# Patient Record
Sex: MALE | Race: WHITE | NOT HISPANIC OR LATINO | Employment: UNEMPLOYED | URBAN - METROPOLITAN AREA
[De-identification: names, ages, dates, MRNs, and addresses within clinical notes are randomized per-mention and may not be internally consistent; named-entity substitution may affect disease eponyms.]

---

## 2017-07-28 ENCOUNTER — COMMUNICATION - HEALTHEAST (OUTPATIENT)
Dept: SCHEDULING | Facility: CLINIC | Age: 27
End: 2017-07-28

## 2017-07-28 ENCOUNTER — OFFICE VISIT - HEALTHEAST (OUTPATIENT)
Dept: FAMILY MEDICINE | Facility: CLINIC | Age: 27
End: 2017-07-28

## 2017-07-28 DIAGNOSIS — L03.115 CELLULITIS OF RIGHT THIGH: ICD-10-CM

## 2017-07-28 DIAGNOSIS — S71.111D LACERATION OF RIGHT THIGH, SUBSEQUENT ENCOUNTER: ICD-10-CM

## 2017-07-30 ENCOUNTER — OFFICE VISIT - HEALTHEAST (OUTPATIENT)
Dept: FAMILY MEDICINE | Facility: CLINIC | Age: 27
End: 2017-07-30

## 2017-07-30 DIAGNOSIS — L03.115 CELLULITIS OF RIGHT THIGH: ICD-10-CM

## 2017-07-30 DIAGNOSIS — S71.111D LACERATION OF RIGHT THIGH, SUBSEQUENT ENCOUNTER: ICD-10-CM

## 2017-08-09 ENCOUNTER — OFFICE VISIT - HEALTHEAST (OUTPATIENT)
Dept: FAMILY MEDICINE | Facility: CLINIC | Age: 27
End: 2017-08-09

## 2017-08-09 DIAGNOSIS — Z48.02 VISIT FOR SUTURE REMOVAL: ICD-10-CM

## 2018-12-05 ENCOUNTER — OFFICE VISIT - HEALTHEAST (OUTPATIENT)
Dept: FAMILY MEDICINE | Facility: CLINIC | Age: 28
End: 2018-12-05

## 2018-12-05 DIAGNOSIS — J32.0 CHRONIC MAXILLARY SINUSITIS: ICD-10-CM

## 2019-08-23 ENCOUNTER — TELEPHONE (OUTPATIENT)
Dept: BEHAVIORAL HEALTH | Facility: CLINIC | Age: 29
End: 2019-08-23

## 2019-08-23 NOTE — TELEPHONE ENCOUNTER
IV User Heroin  Referral from [facility] Meg Lepe [phone]  Reg updated, no prior call.     Referral created. Inbasket to verifiers.   Call to Meg and lvm asking she and or pt call to confirm insur bens. Referral on hold until insurance verified. Filed in copy room  tha

## 2019-08-26 NOTE — TELEPHONE ENCOUNTER
Patient called with his [facility] insurance info. Already sent to verifiers this am.

## 2019-08-27 NOTE — TELEPHONE ENCOUNTER
"  LP SCREEN TELEPHONE NOTE   [facility] [place] paperwork was reviewed by JED Dee and the patient was deemed ELIGIBLE for the LP program.     Medical: The patient is medically stable and did not appear to need a medical screening with the LP RN at this time. No meds. No open wounds.      Insurance: [facility] LOCAL - \"ind/fam gold plan\". Patient talked with Maylin in the Business Office \"Rec'd call from Pt. Per notes, Pt would owe $7k OOP max for L+ program with a 1/3 deposit up front including the R&B fee. Pt said he would think through his options.\"                      The admitting counselor NEEDS to notify CENTRAL INTAKE of the patient's admission to treatment on the day/evening of the admission with a routed telephone note.   After being informed of the admission date for treatment CENTRAL INTAKE will need to fill out the [facility] referral forms with the patient's start date and then fax the [facility] referral forms to [facility] to activate the authorization for treatment.     This will be a: HALF+ evaluation. (2-PAGE R25 UPDATE NOTE, LP UPDATE NOTE, ADDENDUM, VAA, ISP, SHAHRIAR's, ABA, & SBAR)     IV use or Pregnant: This patient is not pregnant or an IV drug user.  No sex offenses.      Business office: Last name H-Q:  The patient will need to talk to and be financially approved for the LP program by Bradley Rodgers @ ([phone]) or by someone else @ the main B.O. #: [phone]     List: This patient CAN be placed on the COMMUNITY LP Waiting List now.      Group: Mixed Group     Additional Info as needed: He uses heroin daily (snort). He last used today. He is aware of Detox protocol, but it feels getting in is impossible, so he will detox himself. He does not have prescribed Suboxone. I told him the waitlist is 3 to 4 weeks, and he should beclean for a week prior to not be at risk of withdrawal. I mailed him an LP Info Packet and send Inbasket to Business Office. I also gave him [facility]'s # because they usually " have sooner openings. Due to OOP costs, he may look elsewhere, but please place him LP list.      The best current contact telephone number for the patient is: [phone].        Thanks,  JED Dee   8/27/2019

## 2019-08-29 ENCOUNTER — HOSPITAL ENCOUNTER (INPATIENT)
Facility: CLINIC | Age: 29
LOS: 1 days | Discharge: LEFT BEFORE DISCHARGE | DRG: 894 | End: 2019-08-30
Attending: EMERGENCY MEDICINE | Admitting: PSYCHIATRY & NEUROLOGY
Payer: COMMERCIAL

## 2019-08-29 DIAGNOSIS — F11.10 HEROIN ABUSE (H): ICD-10-CM

## 2019-08-29 LAB
ALBUMIN SERPL-MCNC: 4.2 G/DL (ref 3.4–5)
ALP SERPL-CCNC: 76 U/L (ref 40–150)
ALT SERPL W P-5'-P-CCNC: 38 U/L (ref 0–70)
AMPHETAMINES UR QL SCN: NEGATIVE
ANION GAP SERPL CALCULATED.3IONS-SCNC: 9 MMOL/L (ref 3–14)
AST SERPL W P-5'-P-CCNC: 18 U/L (ref 0–45)
BARBITURATES UR QL: NEGATIVE
BASOPHILS # BLD AUTO: 0 10E9/L (ref 0–0.2)
BASOPHILS NFR BLD AUTO: 0.2 %
BENZODIAZ UR QL: NEGATIVE
BILIRUB SERPL-MCNC: 0.7 MG/DL (ref 0.2–1.3)
BUN SERPL-MCNC: 10 MG/DL (ref 7–30)
CALCIUM SERPL-MCNC: 9.2 MG/DL (ref 8.5–10.1)
CANNABINOIDS UR QL SCN: NEGATIVE
CHLORIDE SERPL-SCNC: 103 MMOL/L (ref 94–109)
CO2 SERPL-SCNC: 25 MMOL/L (ref 20–32)
COCAINE UR QL: NEGATIVE
CREAT SERPL-MCNC: 0.86 MG/DL (ref 0.66–1.25)
DIFFERENTIAL METHOD BLD: ABNORMAL
EOSINOPHIL # BLD AUTO: 0 10E9/L (ref 0–0.7)
EOSINOPHIL NFR BLD AUTO: 0.2 %
ERYTHROCYTE [DISTWIDTH] IN BLOOD BY AUTOMATED COUNT: 12.4 % (ref 10–15)
ETHANOL UR QL SCN: NEGATIVE
GFR SERPL CREATININE-BSD FRML MDRD: >90 ML/MIN/{1.73_M2}
GLUCOSE SERPL-MCNC: 106 MG/DL (ref 70–99)
HCT VFR BLD AUTO: 46.8 % (ref 40–53)
HGB BLD-MCNC: 16.9 G/DL (ref 13.3–17.7)
IMM GRANULOCYTES # BLD: 0 10E9/L (ref 0–0.4)
IMM GRANULOCYTES NFR BLD: 0.3 %
LYMPHOCYTES # BLD AUTO: 1.3 10E9/L (ref 0.8–5.3)
LYMPHOCYTES NFR BLD AUTO: 10.3 %
MCH RBC QN AUTO: 29.6 PG (ref 26.5–33)
MCHC RBC AUTO-ENTMCNC: 36.1 G/DL (ref 31.5–36.5)
MCV RBC AUTO: 82 FL (ref 78–100)
MONOCYTES # BLD AUTO: 0.6 10E9/L (ref 0–1.3)
MONOCYTES NFR BLD AUTO: 5 %
NEUTROPHILS # BLD AUTO: 10.3 10E9/L (ref 1.6–8.3)
NEUTROPHILS NFR BLD AUTO: 84 %
NRBC # BLD AUTO: 0 10*3/UL
NRBC BLD AUTO-RTO: 0 /100
OPIATES UR QL SCN: POSITIVE
PLATELET # BLD AUTO: 237 10E9/L (ref 150–450)
POTASSIUM SERPL-SCNC: 3.6 MMOL/L (ref 3.4–5.3)
PROT SERPL-MCNC: 8.3 G/DL (ref 6.8–8.8)
RBC # BLD AUTO: 5.71 10E12/L (ref 4.4–5.9)
SODIUM SERPL-SCNC: 137 MMOL/L (ref 133–144)
WBC # BLD AUTO: 12.2 10E9/L (ref 4–11)

## 2019-08-29 PROCEDURE — 80320 DRUG SCREEN QUANTALCOHOLS: CPT | Performed by: FAMILY MEDICINE

## 2019-08-29 PROCEDURE — 80053 COMPREHEN METABOLIC PANEL: CPT | Performed by: FAMILY MEDICINE

## 2019-08-29 PROCEDURE — 99285 EMERGENCY DEPT VISIT HI MDM: CPT | Mod: 25

## 2019-08-29 PROCEDURE — 85025 COMPLETE CBC W/AUTO DIFF WBC: CPT | Performed by: FAMILY MEDICINE

## 2019-08-29 PROCEDURE — 80307 DRUG TEST PRSMV CHEM ANLYZR: CPT | Performed by: FAMILY MEDICINE

## 2019-08-29 PROCEDURE — 99285 EMERGENCY DEPT VISIT HI MDM: CPT | Mod: Z6 | Performed by: EMERGENCY MEDICINE

## 2019-08-29 SDOH — HEALTH STABILITY: MENTAL HEALTH: HOW OFTEN DO YOU HAVE A DRINK CONTAINING ALCOHOL?: NEVER

## 2019-08-29 ASSESSMENT — ACTIVITIES OF DAILY LIVING (ADL)
BATHING: 0-->INDEPENDENT
DRESS: 0-->INDEPENDENT
PRIOR_FUNCTIONAL_LEVEL_COMMENT: GOOD
SWALLOWING: 0-->SWALLOWS FOODS/LIQUIDS WITHOUT DIFFICULTY
RETIRED_EATING: 0-->INDEPENDENT
TOILETING: 0-->INDEPENDENT
RETIRED_COMMUNICATION: 0-->UNDERSTANDS/COMMUNICATES WITHOUT DIFFICULTY

## 2019-08-29 ASSESSMENT — MIFFLIN-ST. JEOR: SCORE: 1924.27

## 2019-08-30 VITALS
RESPIRATION RATE: 16 BRPM | HEIGHT: 72 IN | HEART RATE: 75 BPM | SYSTOLIC BLOOD PRESSURE: 136 MMHG | WEIGHT: 200 LBS | TEMPERATURE: 98.9 F | BODY MASS INDEX: 27.09 KG/M2 | DIASTOLIC BLOOD PRESSURE: 79 MMHG | OXYGEN SATURATION: 96 %

## 2019-08-30 PROBLEM — F11.93 OPIATE WITHDRAWAL (H): Status: ACTIVE | Noted: 2019-08-30

## 2019-08-30 PROCEDURE — 12800008 ZZH R&B CD ADULT

## 2019-08-30 PROCEDURE — 99234 HOSP IP/OBS SM DT SF/LOW 45: CPT | Performed by: PSYCHIATRY & NEUROLOGY

## 2019-08-30 PROCEDURE — 25000132 ZZH RX MED GY IP 250 OP 250 PS 637: Performed by: PSYCHIATRY & NEUROLOGY

## 2019-08-30 PROCEDURE — 25000132 ZZH RX MED GY IP 250 OP 250 PS 637: Performed by: CLINICAL NURSE SPECIALIST

## 2019-08-30 PROCEDURE — 25000131 ZZH RX MED GY IP 250 OP 636 PS 637: Performed by: CLINICAL NURSE SPECIALIST

## 2019-08-30 PROCEDURE — 99231 SBSQ HOSP IP/OBS SF/LOW 25: CPT | Performed by: PHYSICIAN ASSISTANT

## 2019-08-30 PROCEDURE — 99207 ZZC CDG-HISTORY COMP: MEETS EXP. PROBLEM FOCUSED - DOWN CODED LACK OF HPI: CPT | Performed by: PHYSICIAN ASSISTANT

## 2019-08-30 PROCEDURE — H2032 ACTIVITY THERAPY, PER 15 MIN: HCPCS

## 2019-08-30 PROCEDURE — HZ2ZZZZ DETOXIFICATION SERVICES FOR SUBSTANCE ABUSE TREATMENT: ICD-10-PCS | Performed by: PSYCHIATRY & NEUROLOGY

## 2019-08-30 RX ORDER — ACETAMINOPHEN 325 MG/1
650 TABLET ORAL EVERY 4 HOURS PRN
Status: DISCONTINUED | OUTPATIENT
Start: 2019-08-30 | End: 2019-08-30 | Stop reason: HOSPADM

## 2019-08-30 RX ORDER — NICOTINE 21 MG/24HR
1 PATCH, TRANSDERMAL 24 HOURS TRANSDERMAL DAILY
Status: DISCONTINUED | OUTPATIENT
Start: 2019-08-30 | End: 2019-08-30 | Stop reason: HOSPADM

## 2019-08-30 RX ORDER — HYDROXYZINE HYDROCHLORIDE 25 MG/1
25-50 TABLET, FILM COATED ORAL EVERY 4 HOURS PRN
Status: DISCONTINUED | OUTPATIENT
Start: 2019-08-30 | End: 2019-08-30 | Stop reason: HOSPADM

## 2019-08-30 RX ORDER — ALUMINA, MAGNESIA, AND SIMETHICONE 2400; 2400; 240 MG/30ML; MG/30ML; MG/30ML
30 SUSPENSION ORAL EVERY 4 HOURS PRN
Status: DISCONTINUED | OUTPATIENT
Start: 2019-08-30 | End: 2019-08-30 | Stop reason: HOSPADM

## 2019-08-30 RX ORDER — LOPERAMIDE HCL 2 MG
2 CAPSULE ORAL 4 TIMES DAILY PRN
Status: DISCONTINUED | OUTPATIENT
Start: 2019-08-30 | End: 2019-08-30 | Stop reason: HOSPADM

## 2019-08-30 RX ORDER — ONDANSETRON 4 MG/1
4 TABLET, ORALLY DISINTEGRATING ORAL EVERY 6 HOURS PRN
Status: DISCONTINUED | OUTPATIENT
Start: 2019-08-30 | End: 2019-08-30 | Stop reason: HOSPADM

## 2019-08-30 RX ORDER — BUPRENORPHINE 2 MG/1
2 TABLET SUBLINGUAL
Status: COMPLETED | OUTPATIENT
Start: 2019-08-30 | End: 2019-08-30

## 2019-08-30 RX ORDER — BUPRENORPHINE 2 MG/1
2 TABLET SUBLINGUAL 4 TIMES DAILY
Status: DISCONTINUED | OUTPATIENT
Start: 2019-08-30 | End: 2019-08-30 | Stop reason: HOSPADM

## 2019-08-30 RX ADMIN — BUPRENORPHINE HCL 2 MG: 2 TABLET SUBLINGUAL at 16:20

## 2019-08-30 RX ADMIN — BUPRENORPHINE HCL 2 MG: 2 TABLET SUBLINGUAL at 10:05

## 2019-08-30 RX ADMIN — NICOTINE 1 PATCH: 21 PATCH, EXTENDED RELEASE TRANSDERMAL at 10:05

## 2019-08-30 RX ADMIN — HYDROXYZINE HYDROCHLORIDE 50 MG: 25 TABLET, FILM COATED ORAL at 01:53

## 2019-08-30 RX ADMIN — BUPRENORPHINE HCL 2 MG: 2 TABLET SUBLINGUAL at 13:23

## 2019-08-30 RX ADMIN — ONDANSETRON 4 MG: 4 TABLET, ORALLY DISINTEGRATING ORAL at 10:05

## 2019-08-30 ASSESSMENT — ACTIVITIES OF DAILY LIVING (ADL)
HYGIENE/GROOMING: INDEPENDENT
DRESS: INDEPENDENT
ORAL_HYGIENE: INDEPENDENT

## 2019-08-30 ASSESSMENT — ENCOUNTER SYMPTOMS
COLOR CHANGE: 0
RHINORRHEA: 1
EYE REDNESS: 0
NAUSEA: 1
ABDOMINAL PAIN: 0
MYALGIAS: 1
HEADACHES: 0
CONFUSION: 0
VOMITING: 1
FEVER: 0
DIFFICULTY URINATING: 0
ARTHRALGIAS: 0
SHORTNESS OF BREATH: 0
NECK STIFFNESS: 0

## 2019-08-30 ASSESSMENT — MIFFLIN-ST. JEOR: SCORE: 1915.19

## 2019-08-30 NOTE — PROGRESS NOTES
Case Management Note  8/30/2019    Met with pt to discuss discharge planning. Pt reports he wants to attend LP, hopes he can get back on the waiting list. Pt had been offered an LP bed 8/29/19, but had declined it due to high OOP costs and been taken off the community waitlist. Pt's OOP costs approx $7,000 for LP. Writer agreed to have pt placed back on priority waitlist, but also offered to review other programs to see if there are any that are better covered with his [employer] commercial plan. Pt open to other options. CM continues.     Leann Haskins, LAURA, LADC

## 2019-08-30 NOTE — TELEPHONE ENCOUNTER
"8/30/2019  Pt was approved by Anjana Bennett on 8/27/2019 for LP.  Pt is currently on 3A.      LP SCREEN TELEPHONE NOTE   [facility] [place] paperwork was reviewed by JED Baron and the patient was deemed ELIGIBLE for the LP program.     Medical: Deferred, because this patient is currently on 3A IP detoxification unit.     Insurance: [facility] LOCAL - The admitting counselor NEEDS to notify CENTRAL INTAKE of the patient's admission to treatment on the day/evening of the admission with a routed telephone note.   After being informed of the admission date for treatment CENTRAL INTAKE will need to fill out the [facility] referral forms with the patient's start date and then fax the [facility] referral forms to [facility] to activate the authorization for treatment.     This will be a: 3A DIRECT TRANSFER (F140 will complete the LP UPDATE NOTE, VAA, ISP, SHAHRIAR's, DAANES & Update SBAR)     IV use or Pregnant: This patient is not pregnant or an IV drug user.     Business office: pt needs to be cleared by the CHENG.    Patient talked with Maylin in the Business Office \"Rec'd call from Pt. Per notes, Pt would owe $7k OOP max for L+ program with a 1/3 deposit up front including the R&B fee. Pt said he would think through his options.\"       List: This patient CAN NOT be placed on the PRIORITY LP Waiting List until after being financially approved for the LP program by a [facility] .       Group: Men's Group or [facility] Enhanced Mixed Group     Additional Info as needed: NA     The best current contact telephone number for the patient is: 3A @ [phone]     Thanks,  JED Baron   8/30/2019    "

## 2019-08-30 NOTE — PROGRESS NOTES
08/30/19 0110   Patient Belongings   Did you bring any home meds/supplements to the hospital?  No   Patient Belongings locker   Patient Belongings Put in Hospital Secure Location (Security or Locker, etc.) clothing;shoes   Belongings Search Yes   Clothing Search Yes   Second Staff  Gus Brady All of patient belongings are in a bin.     BIN:  Shirt, shorts, jogging pants, a hoodie, shoes and socks.    A               Admission:  I am responsible for any personal items that are not sent to the safe or pharmacy.  [facility] is not responsible for loss, theft or damage of any property in my possession.    Signature:  _________________________________ Date: _______  Time: _____                                              Staff Signature:  ____________________________ Date: ________  Time: _____      2nd Staff person, if patient is unable/unwilling to sign:    Signature: ________________________________ Date: ________  Time: _____     Discharge:  [facility] has returned all of my personal belongings:    Signature: _________________________________ Date: ________  Time: _____                                          Staff Signature:  ____________________________ Date: ________  Time: _____

## 2019-08-30 NOTE — PROGRESS NOTES
Case Management Note  8/30/2019    Received email from [facility] regarding covered programs. Pt is covered for [facility] programs. Likely not covered for [facility]. Pt signed releases for both and requested info be sent to [facility]. Writer faxed pt's information this afternoon. Pt feels capable of following up outside of the hospital. CM complete at this time.     Leann Haskins LPC, [facility]

## 2019-08-30 NOTE — CONSULTS
[facility], [place]  Consult Note - Hospitalist Service     Date of Admission:  8/29/2019  Consult Requested by: Debra Naegele, CNP  Reason for Consult: Medical co-management    Assessment & Plan   James Brown is a 29 year old male with a history of opiate abuse who was admitted to station 3A on 8/29/19 seeking detoxification from opiates.    Opiate abuse in acute withdrawal: Using 1 gram of heroine daily, last used yesterday. Utox in the ED + for opiates.    - Management per Psychiatry    Leukocytosis: WBC 12.2 on admission, most likely stress response secondary to acute opiate withdrawal. He is afebrile and lacks any s/s suggestive of an infectious process.    - Repeat CBC on Sunday 9/1    Tobacco abuse: Smoking at least 1/2 ppd, would like to try quitting.    - Continue nicotine patch     Medicine will follow results of repeat CBC. Please do not hesitate to contact if new questions or concerns arise.       Amanda Collins PA-C  [facility], [place]  Hospitalist Service  Pager: [phone]      ______________________________________________________________________    Chief Complaint   Opiate withdrawal    History is obtained from the patient    History of Present Illness   James Brown is a 29 year old male with a history of opiate abuse who was admitted to station 3A on 8/29/19 seeking detoxification from opiates. He has been using 1 gram of heroine daily, smoking and snorting. Last used yesterday. No IVDU. No other substance use. No history of seizures. Currently he reports feeling nauseated, tired, having stomach cramps. Ate some breakfast but does not have much of an appetite. No vomiting, diarrhea, constipation. Currently smoking at least 1/2 ppd and is interested in quitting. Has started on nicotine replacement here and feels that will be helpful.     He is otherwise healthy with no ongoing medical problems. No history of cardiovascular, pulmonary,  or renal disease. Is not diabetic. Denies any chest pain/pressure, palpitations, dyspnea, cough, cold symptoms, fever/chills, rashes, urinary symptoms.     Review of Systems   The 10 point Review of Systems is negative other than noted in the HPI or here.     Past Medical History    I have reviewed this patient's medical history and updated it with pertinent information if needed.   Past Medical History:   Diagnosis Date     Substance abuse (H)        Past Surgical History   I have reviewed this patient's surgical history and updated it with pertinent information if needed.  History reviewed. No pertinent surgical history.    Social History   I have reviewed this patient's social history and updated it with pertinent information if needed.  Social History     Tobacco Use     Smoking status: Current Every Day Smoker     Smokeless tobacco: Never Used   Substance Use Topics     Alcohol use: Never     Frequency: Never     Drug use: Yes     Types: Opiates, Other       Family History   I have reviewed this patient's family history and updated it with pertinent information if needed.   No family history on file.    Medications   Current Facility-Administered Medications   Medication     acetaminophen (TYLENOL) tablet 650 mg     alum & mag hydroxide-simethicone (MYLANTA ES/MAALOX  ES) suspension 30 mL     buprenorphine (SUBUTEX) sublingual tablet 2 mg     hydrOXYzine (ATARAX) tablet 25-50 mg     loperamide (IMODIUM) capsule 2 mg     magnesium hydroxide (MILK OF MAGNESIA) suspension 30 mL     nicotine (NICODERM CQ) 21 MG/24HR 24 hr patch 1 patch     nicotine Patch in Place     [START ON 8/31/2019] nicotine patch REMOVAL     ondansetron (ZOFRAN-ODT) ODT tab 4 mg       Allergies   No Known Allergies    Physical Exam   Vital Signs: Temp: 97.8  F (36.6  C) Temp src: Oral BP: (!) 139/92 Pulse: 118   Resp: 16 SpO2: 99 % O2 Device: None (Room air)    Weight: 200 lbs 0 oz    Constitutional: Awake and alert, in no apparent distress.    Eyes: Sclera clear, anicteric   ENT: Mucous membranes moist. PERRL  Hematologic / Lymphatic: No cervical or supraclavicular lymphadenopathy.   Respiratory: Breathing comfortably on room air. CTA bilaterally with no crackles, wheezing, or rhonchi. Good air entry throughout.   Cardiovascular: Tachycardic, regular rhythm. Normal S1/S2. No rubs or murmurs. Intact bilateral pedal pulses. No peripheral edema.   GI: Soft, non-tender, non-distended. Bowel sounds present.   Skin:  Good color. No jaundice. No visible rashes, lesions, or bruising of concern.   Neurologic: Alert and oriented to person, place, and time. No focal deficits. Moves all extremities. No tremor.       Data   ROUTINE IP LABS (Last four results)  Recent Labs   Lab 08/29/19  2218      POTASSIUM 3.6   CHLORIDE 103   CO2 25   ANIONGAP 9   *   BUN 10   CR 0.86   RALPH 9.2   PROTTOTAL 8.3   ALBUMIN 4.2   BILITOTAL 0.7   ALKPHOS 76   AST 18   ALT 38     Recent Labs   Lab 08/29/19  2218   WBC 12.2*   RBC 5.71   HGB 16.9   HCT 46.8   MCV 82   MCH 29.6   MCHC 36.1   RDW 12.4        No lab results found in last 7 days.     Glucose Values Latest Ref Rng & Units 8/29/2019   Bedside Glucose (mg/dl )  - --   GLUCOSE 70 - 99 mg/dL 106(H)

## 2019-08-30 NOTE — TELEPHONE ENCOUNTER
Pt added to lodging plus wait list/priority from [facility].      Note,  Pt does have p/a that are required before admission. See referral notes from business office.

## 2019-08-30 NOTE — PLAN OF CARE
"Pt. Requested discharge as \"I have things to do.\" Pt. Vital signs stable. COWS 4. Provider notified; discharge order placed AMA. Patient agrees. Pt denies any suicidal ideation, plans or intent at this time. Patient is discharged at this time.  "

## 2019-08-30 NOTE — PROGRESS NOTES
"CLINICAL NUTRITION SERVICES - ASSESSMENT NOTE     Nutrition Prescription    RECOMMENDATIONS FOR MDs/PROVIDERS TO ORDER:  None at this time    Malnutrition Status:    Patient does not meet two of the above criteria necessary for diagnosing malnutrition but is at risk for malnutrition    Recommendations already ordered by Registered Dietitian (RD):  None at this time- pt declined    Future/Additional Recommendations:  Monitor PO and wt trends   Monitor need for nutrition supplements/snack      REASON FOR ASSESSMENT  James Brown is a/an 29 year old male assessed by the dietitian for Admission Nutrition Risk Screen for reduced oral intake over the last month    Information obtained from chart review: Patient admitted for heroin withdrawal, suicidal ideation     NUTRITION HISTORY  Pt reports that his appetite has been poor the past few weeks. He would go 2 or 3 days without food and then eat something. James reports that when he has a good appetite he will eat 3 meals a day. He cooks at home and likes to make meat and vegetables.     CURRENT NUTRITION ORDERS  Diet: Regular  Intake/Tolerance: Pt reports that his appetite is improving a little. For lunch today he had 1/2 a , soup, and a brownie.     LABS  Labs reviewed    MEDICATIONS  Medications reviewed    ANTHROPOMETRICS  Height: 182.9 cm (6' 0\")  Most Recent Weight: 90.7 kg (200 lb)    IBW: 81 kg (112%)   BMI: Overweight BMI 25-29.9  Weight History: Wt loss of 10 lbs (4.8%) over the past 3 months.   5/19/19- 95.3 kg (210 lb)     Dosing Weight: 91 kg (most recent wt)     ASSESSED NUTRITION NEEDS  Estimated Energy Needs: 2353-0378 kcals/day (25 - 30 kcals/kg)  Justification: Maintenance  Estimated Protein Needs: 73-91 grams protein/day (0.8 - 1 grams of pro/kg)  Justification: Maintenance  Estimated Fluid Needs: (1 mL/kcal)   Justification: Maintenance    PHYSICAL FINDINGS  See malnutrition section below.    MALNUTRITION  % Intake: < 75% for > 7 days " (non-severe)  % Weight Loss: Weight loss does not meet criteria  Subcutaneous Fat Loss: None observed  Muscle Loss: None observed  Fluid Accumulation/Edema: None noted  Malnutrition Diagnosis: Patient does not meet two of the above criteria necessary for diagnosing malnutrition but is at risk for malnutrition    NUTRITION DIAGNOSIS  Inadequate oral intake related to poor appetite 2/2 substance abuse as evidenced by pt report and wt loss of 4.8% over the past 3 months.       INTERVENTIONS  Implementation  Nutrition Education: Discussed current appetite/intake and role of RD. Encouraged meals TID and snacks.    Medical food supplement therapy/snacks: Pt declined at this time.     Goals  Patient to consume % of nutritionally adequate meal trays TID, or the equivalent with supplements/snacks.     Monitoring/Evaluation  Progress toward goals will be monitored and evaluated per protocol.    Llii Jane RD, LD  Unit pager: [phone]

## 2019-08-30 NOTE — ED PROVIDER NOTES
[facility] EMERGENCY DEPARTMENT ([facility])    8/29/19     ED 17 8:45 PM   History     Chief Complaint   Patient presents with     Addiction Problem     seeking help with detox from heroin; currently having withdrawal S & S of aches, abdominal pain, N & V, runny nose; Heroin snort and smoke, used 1 gm daily, last use about 0800 this morning; does not have a detox bed on hold     Suicidal     thoughts of overdosing on pills but no intent     The history is provided by the patient and medical records.     James Brown is a 29 year old male who presents seeking opiate detox. He has been using 1 gm heroin daily via smoking and snorting, last used today. No other drug use.  When he withdraws he develops symptoms of generalized body aches, abdominal pain, nausea, vomiting and rhinorrhea.  He had some passive suicidal ideation in triage, denies any suicidal or homicidal ideation at this time. He is not on any medications, is healthy at baseline.  He did not call ahead for a bed.  He is accompanied by his father Antwon.    I have reviewed the Medications, Allergies, Past Medical and Surgical History, and Social History in the [employer] system.  PAST MEDICAL HISTORY:   Past Medical History:   Diagnosis Date     Substance abuse (H)        PAST SURGICAL HISTORY: History reviewed. No pertinent surgical history.    FAMILY HISTORY: No family history on file.    SOCIAL HISTORY:   Social History     Tobacco Use     Smoking status: Current Every Day Smoker     Smokeless tobacco: Never Used   Substance Use Topics     Alcohol use: Never     Frequency: Never       Patient's Medications    No medications on file        No Known Allergies   Review of Systems   Constitutional: Negative for fever.   HENT: Positive for rhinorrhea. Negative for congestion.    Eyes: Negative for redness.   Respiratory: Negative for shortness of breath.    Cardiovascular: Negative for chest pain.   Gastrointestinal: Positive for nausea and vomiting. Negative  for abdominal pain.   Genitourinary: Negative for difficulty urinating.   Musculoskeletal: Positive for myalgias. Negative for arthralgias and neck stiffness.   Skin: Negative for color change.   Neurological: Negative for headaches.   Psychiatric/Behavioral: Negative for confusion and suicidal ideas.       Physical Exam   BP: 133/81  Pulse: 86  Temp: 97.8  F (36.6  C)  Resp: 16  Height: 182.9 cm (6')  Weight: 91.6 kg (202 lb)  SpO2: 99 %      Physical Exam   Constitutional: He is oriented to person, place, and time. He appears well-developed and well-nourished. No distress.   HENT:   Head: Normocephalic and atraumatic.   Mouth/Throat: No oropharyngeal exudate.   Eyes: Pupils are equal, round, and reactive to light. Right eye exhibits no discharge. Left eye exhibits no discharge. No scleral icterus.   Neck: Normal range of motion. Neck supple.   Cardiovascular: Normal rate, regular rhythm, normal heart sounds and intact distal pulses. Exam reveals no gallop and no friction rub.   No murmur heard.  Pulmonary/Chest: Effort normal and breath sounds normal. No respiratory distress. He has no wheezes. He exhibits no tenderness.   Abdominal: Soft. Bowel sounds are normal. He exhibits no distension. There is no tenderness.   Musculoskeletal: Normal range of motion. He exhibits no edema, tenderness or deformity.   Neurological: He is alert and oriented to person, place, and time. No cranial nerve deficit.   Skin: Skin is warm and dry. No rash noted. He is not diaphoretic. No erythema. No pallor.   Psychiatric: He has a normal mood and affect.   Nursing note and vitals reviewed.      ED Course        Procedures             Critical Care time:  none             No results found for this or any previous visit (from the past 24 hour(s)).  Medications - No data to display      Assessments & Plan (with Medical Decision Making)   Is a 29-year-old male who presents for detox from heroin use.  Patient uses approximately 1 g/day,  snorting and smoking.  No other drug use.  Last use was this morning.  No suicidal or homicidal ideation.  Exam demonstrated no acute abnormalities.  UDS was positive for opiates.  CBC and CMP are pending at this time.  There is a bed available for patient on detox. We will admit for further monitoring work-up and treatment.    I have reviewed the nursing notes.    I have reviewed the findings, diagnosis, plan and need for follow up with the patient.    New Prescriptions    No medications on file       Final diagnoses:   None   I, Marizol Rosas, am serving as a trained medical scribe to document services personally performed by Sourav Olivas DO based on the provider's statements to me on 8/29/19.  This document has been checked and approved by the attending provider.    Sourav FAROOQ DO, was physically present and have reviewed and verified the accuracy of this note documented by Marizol Rosas medical scribe.        8/29/2019   [facility], [place], EMERGENCY DEPARTMENT     Souarv Olivas DO  08/30/19 190

## 2019-08-30 NOTE — PROGRESS NOTES
Pt came to ER seeking detox from heroin. JESSICA 8/29/19 0800. Pt smokes 1 gram per day for past several months. Pt was suppose to enter LP today but did not make it. Wants to be considered for the program. Denies other chemical use. Denies any hx CD treatment, at a detox 13 yrs ago. Cooperative and pleasant. Denies SI. Has chronic back pain from slipped discs.

## 2019-08-30 NOTE — PLAN OF CARE
Behavioral Team Discussion: (8/30/2019)    Continued Stay Criteria/Rationale: Patient admitted for heroin withdrawal, suicidal ideation .  Plan: The following services will be provided to the patient; psychiatric assessment, medication management, therapeutic milieu, individual and group support, and skills groups.   Participants: 3A Provider: Dr. Javy Santana MD; 3A RN's: Brian Sánchez RN; 3A CM's: Leann Haskins .  Summary/Recommendation: Providers will assess today for treatment recommendations, discharge planning, and aftercare plans. CM will meet with pt for discharge planning.   Medical/Physical: internal medicine consult to be completed 8/30/2019.   Precautions:   Behavioral Orders   Procedures    Code 1 - Restrict to Unit    Routine Programming     As clinically indicated    Status 15     Every 15 minutes.    Withdrawal precautions     Rationale for change in precautions or plan: N/A  Progress: Improving.

## 2019-08-30 NOTE — ED NOTES
ED to Behavioral Floor Handoff    SITUATION  James Brown is a 29 year old male who speaks English and lives in a home with family members The patient arrived in the ED by private car from home with a complaint of Addiction Problem (seeking help with detox from heroin; currently having withdrawal S & S of aches, abdominal pain, N & V, runny nose; Heroin snort and smoke, used 1 gm daily, last use about 0800 this morning; does not have a detox bed on hold) and Suicidal (thoughts of overdosing on pills but no intent)  .The patient's current symptoms started/worsened 1 day(s) ago and during this time the symptoms have increased.   In the ED, pt was diagnosed with   Final diagnoses:   None        Initial vitals were: BP: 133/81  Pulse: 86  Temp: 97.8  F (36.6  C)  Resp: 16  Height: 182.9 cm (6')  Weight: 91.6 kg (202 lb)  SpO2: 99 %   --------  Is the patient diabetic? No   If yes, last blood glucose? --     If yes, was this treated in the ED? --  --------  Is the patient inebriated (ETOH) No or Impaired on other substances? No  MSSA done? N/A  Last MSSA score: --    Were withdrawal symptoms treated? N/A  Does the patient have a seizure history? No. If yes, date of most recent seizure--  --------  Is the patient patient experiencing suicidal ideation? denies current or recent suicidal ideation     Homicidal ideation? denies current or recent homicidal ideation or behaviors.    Self-injurious behavior/urges? denies current or recent self injurious behavior or ideation.  ------  Was pt aggressive in the ED No  Was a code called No  Is the pt now cooperative? Yes  -------  Meds given in ED: Medications - No data to display   Family present during ED course? Yes  Family currently present? Yes    BACKGROUND  Does the patient have a cognitive impairment or developmental disability? No  Allergies: No Known Allergies.   Social demographics are   Social History     Socioeconomic History     Marital status: Single     Spouse name:  None     Number of children: None     Years of education: None     Highest education level: None   Occupational History     None   Social Needs     Financial resource strain: None     Food insecurity:     Worry: None     Inability: None     Transportation needs:     Medical: None     Non-medical: None   Tobacco Use     Smoking status: Current Every Day Smoker     Smokeless tobacco: Never Used   Substance and Sexual Activity     Alcohol use: Never     Frequency: Never     Drug use: Yes     Types: Opiates, Other     Sexual activity: None   Lifestyle     Physical activity:     Days per week: None     Minutes per session: None     Stress: None   Relationships     Social connections:     Talks on phone: None     Gets together: None     Attends Muslim service: None     Active member of club or organization: None     Attends meetings of clubs or organizations: None     Relationship status: None     Intimate partner violence:     Fear of current or ex partner: None     Emotionally abused: None     Physically abused: None     Forced sexual activity: None   Other Topics Concern     None   Social History Narrative     None        ASSESSMENT  Labs results   Labs Ordered and Resulted from Time of ED Arrival Up to the Time of Departure from the ED   DRUG ABUSE SCREEN 6 CHEM DEP URINE ([facility]) - Abnormal; Notable for the following components:       Result Value    Opiates Qualitative Urine Positive (*)     All other components within normal limits   COMPREHENSIVE METABOLIC PANEL - Abnormal; Notable for the following components:    Glucose 106 (*)     All other components within normal limits   CBC WITH PLATELETS DIFFERENTIAL - Abnormal; Notable for the following components:    WBC 12.2 (*)     Absolute Neutrophil 10.3 (*)     All other components within normal limits      Imaging Studies: No results found for this or any previous visit (from the past 24 hour(s)).   Most recent vital signs /81   Pulse 86   Temp 97.8  F  (36.6  C) (Oral)   Resp 16   Ht 1.829 m (6')   Wt 91.6 kg (202 lb)   SpO2 99%   BMI 27.40 kg/m     Abnormal labs/tests/findings requiring intervention:---   Pain control: pt had none  Nausea control: pt had none    RECOMMENDATION  Are any infection precautions needed (MRSA, VRE, etc.)? No If yes, what infection? --  ---  Does the patient have mobility issues? independently. If yes, what device does the pt use? ---  ---  Is patient on 72 hour hold or commitment? No If on 72 hour hold, have hold and rights been given to patient? N/A  Are admitting orders written if after 10 p.m. ?N/A  Tasks needing to be completed:---     Ibrahima Pedersen RN   [facility]--    [phone] [place] [facility]   [phone] [facility]

## 2019-08-30 NOTE — DISCHARGE SUMMARY
"[facility], [place]   Psychiatric History and Physical / Discharge Summary  Admission date: 8/29/2019  Discharge date: 8/30/2019        Chief Complaint:   Heroin        HPI:     The patient is a 28yo male with a history of opiate use disorder who was admitted for smoking or snorting up to 1 gram of heroin daily. The patient did take Suboxone yesterday and precipitated some withdrawal. Has never been on Suboxone maintenance and is considering it. Mood is \"all right.\" Says that he is not feeling good. Has diarrhea. Denies SI. Is hopeful to go to [facility]. Later told staff that he would like to discharge and was discharged AMA.          Past Psychiatric History:     No history of suicide attempts.         Substance Use and History:     Opiate use disorder  Denies alcohol issues.   No CD treatment in the past.   PPD smoker.         Past Medical History:   PAST MEDICAL HISTORY:   Past Medical History:   Diagnosis Date     Substance abuse (H)        PAST SURGICAL HISTORY: History reviewed. No pertinent surgical history.          Family History:   FAMILY HISTORY: No family history on file.        Social History:   Please see the full psychosocial profile from the clinical treatment coordinator.   SOCIAL HISTORY:   Social History     Tobacco Use     Smoking status: Current Every Day Smoker     Smokeless tobacco: Never Used   Substance Use Topics     Alcohol use: Never     Frequency: Never            Physical ROS:   The patient endorsed diarrhea. The remainder of 10-point review of systems was negative except as noted in HPI.         PTA Medications:     No medications prior to admission.          Allergies:   No Known Allergies       Labs:     Recent Results (from the past 48 hour(s))   Drug abuse screen 6 urine (tox)    Collection Time: 08/29/19  8:38 PM   Result Value Ref Range    Amphetamine Qual Urine Negative NEG^Negative    Barbiturates Qual Urine Negative NEG^Negative    " Benzodiazepine Qual Urine Negative NEG^Negative    Cannabinoids Qual Urine Negative NEG^Negative    Cocaine Qual Urine Negative NEG^Negative    Ethanol Qual Urine Negative NEG^Negative    Opiates Qualitative Urine Positive (A) NEG^Negative   Comprehensive metabolic panel    Collection Time: 08/29/19 10:18 PM   Result Value Ref Range    Sodium 137 133 - 144 mmol/L    Potassium 3.6 3.4 - 5.3 mmol/L    Chloride 103 94 - 109 mmol/L    Carbon Dioxide 25 20 - 32 mmol/L    Anion Gap 9 3 - 14 mmol/L    Glucose 106 (H) 70 - 99 mg/dL    Urea Nitrogen 10 7 - 30 mg/dL    Creatinine 0.86 0.66 - 1.25 mg/dL    GFR Estimate >90 >60 mL/min/[1.73_m2]    GFR Estimate If Black >90 >60 mL/min/[1.73_m2]    Calcium 9.2 8.5 - 10.1 mg/dL    Bilirubin Total 0.7 0.2 - 1.3 mg/dL    Albumin 4.2 3.4 - 5.0 g/dL    Protein Total 8.3 6.8 - 8.8 g/dL    Alkaline Phosphatase 76 40 - 150 U/L    ALT 38 0 - 70 U/L    AST 18 0 - 45 U/L   CBC with platelets differential    Collection Time: 08/29/19 10:18 PM   Result Value Ref Range    WBC 12.2 (H) 4.0 - 11.0 10e9/L    RBC Count 5.71 4.4 - 5.9 10e12/L    Hemoglobin 16.9 13.3 - 17.7 g/dL    Hematocrit 46.8 40.0 - 53.0 %    MCV 82 78 - 100 fl    MCH 29.6 26.5 - 33.0 pg    MCHC 36.1 31.5 - 36.5 g/dL    RDW 12.4 10.0 - 15.0 %    Platelet Count 237 150 - 450 10e9/L    Diff Method Automated Method     % Neutrophils 84.0 %    % Lymphocytes 10.3 %    % Monocytes 5.0 %    % Eosinophils 0.2 %    % Basophils 0.2 %    % Immature Granulocytes 0.3 %    Nucleated RBCs 0 0 /100    Absolute Neutrophil 10.3 (H) 1.6 - 8.3 10e9/L    Absolute Lymphocytes 1.3 0.8 - 5.3 10e9/L    Absolute Monocytes 0.6 0.0 - 1.3 10e9/L    Absolute Eosinophils 0.0 0.0 - 0.7 10e9/L    Absolute Basophils 0.0 0.0 - 0.2 10e9/L    Abs Immature Granulocytes 0.0 0 - 0.4 10e9/L    Absolute Nucleated RBC 0.0           Physical and Psychiatric Examination:     BP (!) 139/92   Pulse 118   Temp 97.8  F (36.6  C) (Oral)   Resp 16   Ht 1.829 m (6')   Wt  "90.7 kg (200 lb)   SpO2 99%   BMI 27.12 kg/m    Weight is 200 lbs 0 oz  Body mass index is 27.12 kg/m .    Physical Exam:  I have reviewed the physical exam as documented by by the medical team and agree with findings and assessment and have no additional findings to add at this time.    Mental Status Exam:  Appearance: awake, alert and adequately groomed  Attitude:  cooperative  Eye Contact:  good  Mood:  \"all right.\"   Affect:  mood congruent  Speech:  clear, coherent  Language: fluent and intact in English  Psychomotor, Gait, Musculoskeletal:  no evidence of tardive dyskinesia, dystonia, or tics  Thought Process:  goal oriented  Associations:  no loose associations  Thought Content:  no evidence of suicidal ideation or homicidal ideation and no evidence of psychotic thought  Insight:  fair  Judgement:  fair  Oriented to:  time, person, and place  Attention Span and Concentration:  intact  Recent and Remote Memory:  fair  Fund of Knowledge:  appropriate         Admission Diagnoses:   Opiate disorder severe with dependence and withdrawal          Assessment & Plan:     1) Will start Subutex once COWS score if 9 or greater. Patient open to being on Suboxone at discharge.   2) Patient requested discharge and was discharged AMA.     At the current time of discharge, the patient does not meet criteria for involuntary hospitalization. On the day of discharge, the patient reports that they do not have suicidal or homicidal ideation and would never hurt themselves or others. Steps taken to minimize risk include: assessing patient s behavior and thought process daily during hospital stay, discharging patient with adequate plan for follow up for mental and physical health and discussing safety plan of returning to the hospital should the patient ever have thoughts of harming themselves or others. Therefore, based on all available evidence including the factors cited above, the patient does not appear to be at imminent " risk for self-harm, and is appropriate for outpatient level of care.

## 2019-09-02 NOTE — DISCHARGE INSTRUCTIONS
Behavioral Discharge Planning and Instructions  THANK YOU FOR CHOOSING THE [facility]  3A  [phone]    Summary: You were admitted to Station 3A on *** for detoxification from ***.  A medical exam was performed that included lab work. You have met with a  and opted to return home, await admission to residential treatment.  Please take care and make your recovery a daily priority, James! It was a pleasure working with you and the entire treatment team here wishes you the very best in your recovery!     Recommendation:  Residential treatment    Main Diagnoses:  Per  ***    Major Treatments, Procedures and Findings:  You were treated for *** detoxification using ***. As an outpatient you will be prescribed ***, please take this medication as prescribed until it is gone. You did not complete a chemical dependency assessment. You had labs drawn and those results were reviewed with you. Please take a copy of your lab work with you to your next primary care physician appointment.    Symptoms to Report:  If you experience more anxiety, confusion, sleeplessness, deep sadness or thoughts of suicide, notify your treatment team or notify your primary care physician. IF ANY OF THE SYMPTOMS YOU ARE EXPERIENCING ARE A MEDICAL EMERGENCY CALL 911 IMMEDIATELY.     Lifestyle Adjustment: Adjust your lifestyle to get enough sleep, relaxation, exercise and good nutrition. Continue to develop healthy coping skills to decrease stress and promote a sober living environment. Do not use mood altering substances including alcohol, illegal drugs or addictive medications other than what is currently prescribed.     Disposition: Home, await admission to residential treatment    You were referred to:  [facility]  ([facility] [place] [facility], [facility], [facility])  Local: [phone] or [phone]  Fax: [phone]  Please follow up  with [facility] directly.     Follow-up Appointment:   Appointment Date/Time: ***    Psychiatrist/Primary Care Giver: ***    Address: ***    Phone Number: ***      Resources:   AA/NA and Sponsors are excellent resources for support and you can find one at any support group meeting.   Alcoholics Anonymous (www.alcoholics-anonymous.org): for local information 24 hours/day  AA Intergroup service office in [place] (http://www.aastpaul.org/) [phone]  AA Intergroup service office in [facility]: [phone]. (http://www.aaminneaTupalois.org/)  Narcotics Anonymous (www.nam[place].org) (125) 684-6698  https://aafairviewriverside.org/meetings  SMART Recovery - self management for addiction recovery:  www.smartrecovery.org  Pathways ~ A Health Crisis Resource & Support Center:  116.935.5977.  https://prescribetoprevent.org/patient-education/videos/  http://www.harmreduction.org  [facility] 606-999-7736  Support Group:  AA/NA and Sponsor/support.  National [place] on Mental Illness (www.mn.keila.org): 118.297.1313 or 091-839-3613.  Alcoholics Anonymous (www.alcoholics-anonymous.org): Check your phone book for your local chapter.  Suicide Awareness Voices of Education (SAVE) (www.save.org): 325-204-KOXA (2418)  National Suicide Prevention Line (www.mentalhealthmn.org): 104-912-VRUY (1341)  Mental Health Consumer/Survivor Network of [place] (www.mhcsn.net): 558.747.1424 or 751-288-0763  Mental Health Association of [place] (www.mentalhealth.org): 796.969.2213 or 668-099-4559   Substance Abuse and Mental Health Services (www.samhsa.gov)  [place] Opioid Prevention Coalition: www.opioidcoalition.org    [place] Recovery Connection (MRC)  [facility] connects people seeking recovery to resources that help foster and sustain long-term recovery.  Whether you are seeking resources for treatment, transportation, housing, job training, education, health care or other pathways to recovery, MRC is a great place to start.   987-078-6519.  www.[facility].org    General Medication Instructions:   See your medication sheet(s) for instructions.   Take all medications as prescribed.  Make no changes unless your doctor suggests them.   Go to all your doctor visits.  Be sure to have all your required lab tests. This way, your medicines can be refilled on time.  Do not use any forms of alcohol.    Please Note:  If you have any questions at anytime after you are discharged please call the [facility] [place] detox unit 3AW unit at [phone].  [facility] Behavioral Intake [phone]  Medical Records call [phone]  Outpatient Behavioral Intake call [phone]  LP+ Wait List/Bed Availability call [phone]    Please remember to take all of your behavioral discharge planning and lab paperwork to any follow up appointments, it contains your lab results, diagnosis, medication list and discharge recommendations.      THANK YOU FOR CHOOSING THE [facility]

## 2019-09-09 NOTE — TELEPHONE ENCOUNTER
Called to see if pt. Still interested in LP program. Left VM for pt to call back. Also let pt. Know that this is the 3rd attempt to reach pt and if we do not hear back by 1530 of 9/9/19, pt will be taken off the LP wait list.

## 2021-05-31 VITALS — BODY MASS INDEX: 28.07 KG/M2 | WEIGHT: 207 LBS

## 2021-05-31 VITALS — WEIGHT: 209.5 LBS | BODY MASS INDEX: 28.41 KG/M2

## 2021-05-31 VITALS — BODY MASS INDEX: 28.29 KG/M2 | WEIGHT: 208.6 LBS

## 2021-06-02 VITALS — WEIGHT: 221 LBS | BODY MASS INDEX: 29.97 KG/M2

## 2021-06-12 NOTE — PROGRESS NOTES
"ASSESSMENT:   1. Laceration of right thigh, subsequent encounter     2. Cellulitis of right thigh          PLAN:  He appears to be healing fine. We reviewed signs of worsening infection and when to be re-seen. He will continue keeping the wound clean and dry and will finish the antibiotics. I gave him a new note with light duties for a week and then full duties when his sutures are removed around Aug 10-12. F/U with any worsening or for suture removal.     SUBJECTIVE:   James Brown is a 27 y.o. male presents today for re-evaluation of a right leg injury. He lacerated his right thigh with a utility knife at work on 7/28. Injury was repaired with 5 sutures in the ED. He was reseen for cellulitis and started on Keflex and Clindamycin. Today, he reports that he is overall doing well. He denies any leg pain, fevers, chills, drainage, or new concerns. He has mild diarrhea from the antibiotics. He was told to f/u today and is requesting a work note to return to his job as able. He works in [email or web address] and has a strenuous job. He believes that he can do most activities without problem and plans to \"take it easy\".     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       History   Smoking Status     Never Smoker   Smokeless Tobacco     Never Used       Current Medications:  Current Outpatient Prescriptions on File Prior to Visit   Medication Sig Dispense Refill     cephalexin 500 mg tablet Take 500 mg by mouth 4 (four) times a day for 10 days. 40 tablet 0     clindamycin (CLEOCIN) 150 MG capsule Take 3 capsules (450 mg total) by mouth 4 (four) times a day for 10 days. 120 capsule 0     ibuprofen (ADVIL,MOTRIN) 200 MG tablet Take 200 mg by mouth every 6 (six) hours as needed for pain (Taking 4 tablet).       No current facility-administered medications on file prior to visit.        Allergies:   No Known Allergies    OBJECTIVE:   Vitals:    07/30/17 0842   BP: 102/68   Pulse: 74   Resp: 12 "   Temp: 98.1  F (36.7  C)   TempSrc: Oral   SpO2: 96%   Weight: 209 lb 8 oz (95 kg)     Physical exam reveals a pleasant 27 y.o. male.   Appears healthy, alert and cooperative.  Extremity: 3cm laceration with 5 simple interrupted sutures on the right anterior mid-thigh.  There is mild erythema and warmth surrounding, measuring 9.5cm x 14cm in total. Erythema mainly within marked circumferential border with some extension outside the borders on the medial edge.  There is no induration, fluctuance, tenderness, or drainage from the wound.  Sensation is grossly intact throughout entire extremity.  Cap refill is <2 secs.

## 2021-06-12 NOTE — PROGRESS NOTES
Assessment/Plan:   Suture removal  6 simple interrupted sutures removed from his right anterior thigh laceration.  Well healed with no oozing or open area.  Mild gap between skin edges after sutures out due to epithelial edges being approximated but underneath it is intact and healed. No ongoing infection.  No oozing or bleeding. Able to return to usual duties at work as he has been doing this week without difficulty.      Keep wound clean, may apply lotion to soften skin and scar  Cover with sunscreen when open to air  No restrictions  Note given for work  Follow up as needed    Subjective:      James Brown is a 27 y.o. male who presents for suture removal.  He sustained a laceration to his right anterior thigh 7/27/17, accidental laceration with a utility knife.  There did seem to be muscle penetration and the wound was explored and irrigated.  It was closed with 4-0 ethilon sutures - 6 interrupted.  He did have some subsequent infection and was seen twice in follow up after that.  Infection is resolved and he is here for suture removal.  He has been back on usual duties at work this week but needs a note confirming he can return to work.  No fever.  No swelling or oozing from the wound. Received Tdap 7/27/17 in ED.  No leg pain or weakness or limitations. Hoping to swim this coming week.  NKDA    Current Outpatient Prescriptions on File Prior to Visit   Medication Sig Dispense Refill     ibuprofen (ADVIL,MOTRIN) 200 MG tablet Take 200 mg by mouth every 6 (six) hours as needed for pain (Taking 4 tablet).       No current facility-administered medications on file prior to visit.      There is no problem list on file for this patient.      Objective:     /68  Pulse 76  Temp 98.3  F (36.8  C) (Oral)   Resp 12  Wt 208 lb 9.6 oz (94.6 kg)  SpO2 96%  BMI 28.29 kg/m2    Physical  General Appearance: Alert, cooperative, no distress  Head: Normocephalic, without obvious abnormality, atraumatic  Eyes:  Conjunctivae are normal.   Extremities: No lower extremity edema.  There is a healed laceration with intact sutures and some scabbing around them on his anterior right thigh.  After cleansing the area with alcohol all 6 sutures were removed without complication.  Well healed with no oozing or open area.  Mild gap between skin edges after sutures out due to epithelial edges being approximated but underneath it is intact and healed. No ongoing infection.  No oozing or bleeding.   Psychiatric: Patient has a normal mood and affect.

## 2021-06-12 NOTE — PROGRESS NOTES
ASSESSMENT:   1. Cellulitis of right thigh  cephalexin 500 mg tablet   2. Laceration of right thigh, subsequent encounter          PLAN:  I spoke with  at [facility], who was consulted on the patient's case yesterday in the ER. Recommended cephalexin.  No need for suture removal since infection is nonpurulent and there is no sign of underlying abscess at this time.  Elevate area and rest as much as possible.  Since f/u in 2 days falls on the weekend - recommend he return to the walk in clinic to recheck, sooner in the Emergency Room if fever, chills, flu like symptoms, expanding redness, worsening pain, or red streaking.   Workability provided to be off work through the weekend.     SUBJECTIVE:   James Brown is a 27 y.o. male presents today for evaluation of a right leg injury.  He sustained a right thigh laceration yesterday while at work with a utility knife.  The utility knife was dirty with landscape materials and dirt.  The laceration was repaired in the ER. [facility] was consulted due to the depth of the injury and muscle involvement. He also received an updated tetanus shot and was instructed to have sutures removed in 14 days.  Patient presented today as he noted a red streak around the laceration this morning and has noticed a progressively enlarging red area around it.  Pain has increased today.    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    Denies fever, chills, flu like symptoms. No history of recurrent skin infections.    There is no problem list on file for this patient.      History   Smoking Status     Never Smoker   Smokeless Tobacco     Never Used       Current Medications:  No current outpatient prescriptions on file prior to visit.     No current facility-administered medications on file prior to visit.        Allergies:   No Known Allergies    OBJECTIVE:   Vitals:    07/28/17 1244   BP: 108/48   Pulse: 65   Resp: 12   Temp: 97.8  F (36.6  C)   TempSrc: Oral   SpO2: 100%    Weight: 207 lb (93.9 kg)     Physical exam reveals a pleasant 27 y.o. male.   Appears healthy, alert and cooperative.  Extremity: 3cm laceration with 5 simple interrupted sutures on the right anterior mid-thigh.  There is erythema, induration, and warmth surrounding, measuring 9.5cm x 14cm in total.  There is a red streak going up his right thigh, included in these measurements.  The area of erythema is tender to palpation. There is no drainage from the wound.  Sensation is grossly intact throughout entire extremity.  Cap refill is <2 secs.

## 2021-06-22 NOTE — PROGRESS NOTES
Chief Complaint   Patient presents with     Sinus Problem     Sinus congesion, pressure and pain under eyes. Has been using afren 1 month       HPI: Patient presents today with 2 months of sinus congestion with pressure primarily in the bilateral maxillary sinuses.  The reason he presents today is because he needs to get cleared for receiving a spinal injection due to cervical stenosis.  He works as a  and the neck pain is difficult on his job function.  He does have some mild seasonal allergies and has been using over-the-counter Afrin twice a day for the past 2 months.    No sore throat, cough, significant shortness of breath, fever, chills, ear pain/fullness.  The last time he used Afrin was about 24 hours ago.    ROS:Review of Systems - History obtained from the patient  General ROS: negative  Ophthalmic ROS: negative  ENT ROS: positive for - nasal congestion and nasal discharge  Allergy and Immunology ROS: positive for - nasal congestion and seasonal allergies  Hematological and Lymphatic ROS: negative  Respiratory ROS: negative  Cardiovascular ROS: negative    SH: The Patient's  reports that  has never smoked. he has never used smokeless tobacco. He reports that he drinks alcohol. He reports that he does not use drugs.      FH: The Patient's family history includes No Medical Problems in his brother, father, mother, and sister.     Meds:    Current Outpatient Medications on File Prior to Visit   Medication Sig Dispense Refill     ibuprofen (ADVIL,MOTRIN) 200 MG tablet Take 200 mg by mouth every 6 (six) hours as needed for pain (Taking 4 tablet).       No current facility-administered medications on file prior to visit.        O:  /69 (Patient Site: Right Arm, Patient Position: Sitting, Cuff Size: Adult Large)   Pulse 72   Wt 221 lb (100.2 kg)   SpO2 98%   BMI 29.97 kg/m      Physical Examination:   General appearance - alert, well appearing, and in no distress  Mental status - alert,  oriented to person, place, and time  Eyes - pupils equal and reactive, extraocular eye movements intact  Ears - bilateral TM's and external ear canals normal  Nose -significant swelling of the nasal mucosa bilaterally left worse than right.  Scant drainage noted.  Mouth - mucous membranes moist, pharynx normal without lesions  Neck - supple, no significant adenopathy  Lymphatics - no palpable lymphadenopathy, no hepatosplenomegaly  Chest - clear to auscultation, no wheezes, rales or rhonchi, symmetric air entry  Heart - normal rate and regular rhythm, S1 and S2 normal, no murmurs noted  Skin - normal coloration and turgor, no rashes, no suspicious skin lesions noted      A/P:     Problem List Items Addressed This Visit     None      Visit Diagnoses     Chronic maxillary sinusitis    -  Primary    Relevant Medications    amoxicillin-clavulanate (AUGMENTIN) 875-125 mg per tablet            1. Chronic maxillary sinusitis  1 week of Augmentin due to chronic nature of condition.  I did discuss that this very well may be all related to his overuse of Afrin nasal spray.  We did discuss that this medication is to be used sparingly and no more than 3 days at a time.  Encouraged him to discontinue its use and warned him of probable rebound congestion.  He was encouraged to use saline nasal irrigation and fluticasone nasal spray for symptom relief.  Okay to go forward with injection post Augmentin therapy.  If congestion becomes long-standing issue despite oral antihistamines and fluticasone nasal spray, we could also go forward with allergy testing/ENT.    - amoxicillin-clavulanate (AUGMENTIN) 875-125 mg per tablet; Take 1 tablet by mouth 2 (two) times a day for 7 days.  Dispense: 14 tablet; Refill: 0        Wesley Luna, CNP

## 2022-02-03 NOTE — TELEPHONE ENCOUNTER
----- Message from Carloskatie Leobardo sent at 2/3/2022  9:23 AM CST -----  Contact: self [phone]  Requesting an RX refill or new RX.  Is this a refill or new RX: refill  RX name and strength (copy/paste from chart):  meclizine (ANTIVERT) 12.5 mg tablet  Is this a 30 day or 90 day RX:   Pharmacy name and phone # (copy/paste from chart):    [address] [address]  Phone: [phone] Fax: [phone]    The doctors have asked that we provide their patients with the following 2 reminders -- prescription refills can take up to 72 hours, and a friendly reminder that in the future you can use your [facility] account to request refills: yes    Please call and advise               [facility] available. Patient said he is interested in getting into [facility], but due to the deposit amount that he will have to make to get in, he will pass on the offer. Writer let patient know that he will be taken off [facility] wait list and he if would like to get on the wait list again, he can give [facility] a call. Patient understood. Patient is taken off [facility] wait list.

## 2023-01-17 ENCOUNTER — HOSPITAL ENCOUNTER (EMERGENCY)
Facility: CLINIC | Age: 33
Discharge: HOME | End: 2023-01-18
Attending: EMERGENCY MEDICINE | Admitting: EMERGENCY MEDICINE

## 2023-01-17 DIAGNOSIS — T40.2X1A OPIOID OVERDOSE, ACCIDENTAL OR UNINTENTIONAL, INITIAL ENCOUNTER (H): ICD-10-CM

## 2023-01-17 DIAGNOSIS — F11.93 OPIATE WITHDRAWAL (H): ICD-10-CM

## 2023-01-17 LAB
ALBUMIN SERPL-MCNC: 4.3 G/DL (ref 3.5–5)
ALP SERPL-CCNC: 53 U/L (ref 45–120)
ALT SERPL W P-5'-P-CCNC: 55 U/L (ref 0–45)
ANION GAP SERPL CALCULATED.3IONS-SCNC: 13 MMOL/L (ref 5–18)
AST SERPL W P-5'-P-CCNC: 38 U/L (ref 0–40)
BASOPHILS # BLD AUTO: 0 10E3/UL (ref 0–0.2)
BASOPHILS NFR BLD AUTO: 0 %
BILIRUB SERPL-MCNC: 0.3 MG/DL (ref 0–1)
BUN SERPL-MCNC: 11 MG/DL (ref 8–22)
CALCIUM SERPL-MCNC: 8.5 MG/DL (ref 8.5–10.5)
CHLORIDE BLD-SCNC: 103 MMOL/L (ref 98–107)
CO2 SERPL-SCNC: 21 MMOL/L (ref 22–31)
CREAT SERPL-MCNC: 1.67 MG/DL (ref 0.7–1.3)
EOSINOPHIL # BLD AUTO: 0.1 10E3/UL (ref 0–0.7)
EOSINOPHIL NFR BLD AUTO: 1 %
ERYTHROCYTE [DISTWIDTH] IN BLOOD BY AUTOMATED COUNT: 12.4 % (ref 10–15)
ETHANOL SERPL-MCNC: 12 MG/DL
GFR SERPL CREATININE-BSD FRML MDRD: 55 ML/MIN/1.73M2
GLUCOSE BLD-MCNC: 235 MG/DL (ref 70–125)
HCT VFR BLD AUTO: 46.1 % (ref 40–53)
HGB BLD-MCNC: 16.2 G/DL (ref 13.3–17.7)
IMM GRANULOCYTES # BLD: 0.1 10E3/UL
IMM GRANULOCYTES NFR BLD: 1 %
LYMPHOCYTES # BLD AUTO: 1.5 10E3/UL (ref 0.8–5.3)
LYMPHOCYTES NFR BLD AUTO: 12 %
MCH RBC QN AUTO: 29.5 PG (ref 26.5–33)
MCHC RBC AUTO-ENTMCNC: 35.1 G/DL (ref 31.5–36.5)
MCV RBC AUTO: 84 FL (ref 78–100)
MONOCYTES # BLD AUTO: 0.5 10E3/UL (ref 0–1.3)
MONOCYTES NFR BLD AUTO: 4 %
NEUTROPHILS # BLD AUTO: 10.9 10E3/UL (ref 1.6–8.3)
NEUTROPHILS NFR BLD AUTO: 82 %
NRBC # BLD AUTO: 0 10E3/UL
NRBC BLD AUTO-RTO: 0 /100
PLATELET # BLD AUTO: 224 10E3/UL (ref 150–450)
POTASSIUM BLD-SCNC: 4.7 MMOL/L (ref 3.5–5)
PROT SERPL-MCNC: 7.4 G/DL (ref 6–8)
RBC # BLD AUTO: 5.5 10E6/UL (ref 4.4–5.9)
SODIUM SERPL-SCNC: 137 MMOL/L (ref 136–145)
TROPONIN I SERPL-MCNC: 0.03 NG/ML (ref 0–0.29)
WBC # BLD AUTO: 13.1 10E3/UL (ref 4–11)

## 2023-01-17 PROCEDURE — 96361 HYDRATE IV INFUSION ADD-ON: CPT

## 2023-01-17 PROCEDURE — 99284 EMERGENCY DEPT VISIT MOD MDM: CPT | Mod: 25

## 2023-01-17 PROCEDURE — 82077 ASSAY SPEC XCP UR&BREATH IA: CPT | Performed by: EMERGENCY MEDICINE

## 2023-01-17 PROCEDURE — 80053 COMPREHEN METABOLIC PANEL: CPT | Performed by: EMERGENCY MEDICINE

## 2023-01-17 PROCEDURE — 96374 THER/PROPH/DIAG INJ IV PUSH: CPT

## 2023-01-17 PROCEDURE — 250N000011 HC RX IP 250 OP 636: Performed by: EMERGENCY MEDICINE

## 2023-01-17 PROCEDURE — 93005 ELECTROCARDIOGRAM TRACING: CPT | Performed by: EMERGENCY MEDICINE

## 2023-01-17 PROCEDURE — 85025 COMPLETE CBC W/AUTO DIFF WBC: CPT | Performed by: EMERGENCY MEDICINE

## 2023-01-17 PROCEDURE — 258N000003 HC RX IP 258 OP 636: Performed by: EMERGENCY MEDICINE

## 2023-01-17 PROCEDURE — 36415 COLL VENOUS BLD VENIPUNCTURE: CPT | Performed by: EMERGENCY MEDICINE

## 2023-01-17 PROCEDURE — 84484 ASSAY OF TROPONIN QUANT: CPT | Performed by: EMERGENCY MEDICINE

## 2023-01-17 RX ORDER — ONDANSETRON 2 MG/ML
4 INJECTION INTRAMUSCULAR; INTRAVENOUS ONCE
Status: COMPLETED | OUTPATIENT
Start: 2023-01-17 | End: 2023-01-17

## 2023-01-17 RX ADMIN — SODIUM CHLORIDE 1000 ML: 9 INJECTION, SOLUTION INTRAVENOUS at 23:03

## 2023-01-17 RX ADMIN — ONDANSETRON 4 MG: 2 INJECTION INTRAMUSCULAR; INTRAVENOUS at 23:10

## 2023-01-17 ASSESSMENT — ACTIVITIES OF DAILY LIVING (ADL): ADLS_ACUITY_SCORE: 35

## 2023-01-18 ENCOUNTER — TELEPHONE (OUTPATIENT)
Dept: BEHAVIORAL HEALTH | Facility: CLINIC | Age: 33
End: 2023-01-18

## 2023-01-18 VITALS
HEART RATE: 91 BPM | BODY MASS INDEX: 33.86 KG/M2 | TEMPERATURE: 97.7 F | SYSTOLIC BLOOD PRESSURE: 127 MMHG | DIASTOLIC BLOOD PRESSURE: 60 MMHG | RESPIRATION RATE: 15 BRPM | WEIGHT: 250 LBS | HEIGHT: 72 IN | OXYGEN SATURATION: 97 %

## 2023-01-18 LAB
AMPHETAMINES UR QL SCN: NORMAL
ATRIAL RATE - MUSE: 100 BPM
BARBITURATES UR QL: NORMAL
BENZODIAZ UR QL: NORMAL
CANNABINOIDS UR QL SCN: NORMAL
COCAINE UR QL: NORMAL
CREAT UR-MCNC: 116 MG/DL
DIASTOLIC BLOOD PRESSURE - MUSE: 84 MMHG
INTERPRETATION ECG - MUSE: NORMAL
OPIATES UR QL SCN: NORMAL
OXYCODONE UR QL: NORMAL
P AXIS - MUSE: 63 DEGREES
PCP UR QL SCN: NORMAL
PR INTERVAL - MUSE: 138 MS
QRS DURATION - MUSE: 116 MS
QT - MUSE: 380 MS
QTC - MUSE: 490 MS
R AXIS - MUSE: 71 DEGREES
SYSTOLIC BLOOD PRESSURE - MUSE: 153 MMHG
T AXIS - MUSE: 16 DEGREES
VENTRICULAR RATE- MUSE: 100 BPM

## 2023-01-18 PROCEDURE — 80307 DRUG TEST PRSMV CHEM ANLYZR: CPT | Performed by: EMERGENCY MEDICINE

## 2023-01-18 ASSESSMENT — ACTIVITIES OF DAILY LIVING (ADL): ADLS_ACUITY_SCORE: 35

## 2023-01-18 NOTE — ED NOTES
Patient feels comfortable and safe going home, father is patients ride.   No signs of withdrawal or respiratory depression noted.   Only complaint is a mild headache   Educated on nalaxone use and discharge paperwork and follow up.

## 2023-01-18 NOTE — ED NOTES
Patient resting in bed, remains calm and cooperative, denies needs  Instructed to use the urinal placed at bedside if needing to use the bathroom for a urine sample, patient agreeable

## 2023-01-18 NOTE — ED NOTES
Alert and oriented x 4  Able to ambulate to the bathroom with no difficulties, denies lightheadedness or dizziness while ambulating

## 2023-01-18 NOTE — ED PROVIDER NOTES
EMERGENCY DEPARTMENT ENCOUnter      NAME: James Brown  AGE: 33 year old male  YOB: 1990  MRN: [id number]  EVALUATION DATE & TIME: 1/17/2023 10:24 PM    PCP: Christel Ref-Primary, Physician    ED PROVIDER: Colette June MD      Chief Complaint   Patient presents with     Drug Overdose         FINAL IMPRESSION:  1. Opioid overdose, accidental or unintentional, initial encounter (H)    2. Opiate withdrawal (H)          ED COURSE & MEDICAL DECISION MAKING:      In summary, the patient is a 33-year-old male that presents to the emergency department for evaluation of opioid overdose that required Narcan reversal.  The patient was stable throughout his ED observation.  We will discharge to home with close outpatient follow-up and Narcan.    2230-evaluated patient.  Normal saline 1 L IV was administered  2310-zofran 4 mg IV administered for nausea  0215-patient is awake and alert.  He is comfortable with discharge.    Medical Decision Making    History:    Supplemental history from: Documented in chart, if applicable    External Record(s) reviewed: Documented in chart, if applicable.    Work Up:    Chart documentation includes differential considered and any EKGs or imaging independently interpreted by provider, where specified.    In additional to work up documented, I considered the following work up: Documented in chart, if applicable.    External consultation:    Discussion of management with another provider: Documented in chart, if applicable    Complicating factors:    Care impacted by chronic illness: N/A    Care affected by social determinants of health: Alcohol Abuse and/or Recreational Drug Use    Disposition considerations: Discharge. I prescribed additional prescription strength medication(s) as charted. N/A.                At the conclusion of the encounter I discussed the results of all of the tests and the disposition. The questions were answered. The patient or family acknowledged  understanding and was agreeable with the care plan.         MEDICATIONS GIVEN IN THE EMERGENCY:  Medications   0.9% sodium chloride BOLUS (0 mLs Intravenous Stopped 1/18/23 0007)   ondansetron (ZOFRAN) injection 4 mg (4 mg Intravenous Given 1/17/23 1150)       NEW PRESCRIPTIONS STARTED AT TODAY'S ER VISIT  New Prescriptions    NALOXONE (NARCAN) 4 MG/0.1ML NASAL SPRAY    Spray 1 spray (4 mg) into one nostril alternating nostrils as needed for opioid reversal every 2-3 minutes until assistance arrives          =================================================================    HPI        James Brown is a 33 year old male with a pertinent history of opioid abuse presents to the emergency department for evaluation of an opioid overdose that required 2 doses of Narcan prehospital.  Patient states that he bought an opioid from a dealer, which he thinks may have been fentanyl and snorted it.  Family found him unresponsive and activated EMS.  The patient was awake and alert upon his presentation to the emergency department.  He has had overdoses with opioids previously.  He also admits to drinking alcohol this evening.      REVIEW OF SYSTEMS     Constitutional:  Denies fever or chills  HENT:  Denies sore throat   Respiratory:  Denies cough or shortness of breath   Cardiovascular:  Denies chest pain or palpitations  GI:  Denies abdominal pain, nausea, or vomiting  Musculoskeletal:  Denies any new extremity pain   Skin:  Denies rash   Neurologic:  Denies headache, focal weakness or sensory changes    All other systems reviewed and are negative      PAST MEDICAL HISTORY:  Past Medical History:   Diagnosis Date     Substance abuse (H)        PAST SURGICAL HISTORY:  No past surgical history on file.        CURRENT MEDICATIONS:    naloxone (NARCAN) 4 MG/0.1ML nasal spray        ALLERGIES:  No Known Allergies    FAMILY HISTORY:  Family History   Problem Relation Age of Onset     No Known Problems Mother      No Known Problems  Father      No Known Problems Sister      No Known Problems Brother        SOCIAL HISTORY:   Social History     Socioeconomic History     Marital status: Single   Tobacco Use     Smoking status: Every Day     Smokeless tobacco: Never   Substance and Sexual Activity     Alcohol use: Never     Drug use: Yes     Types: Opiates, Other       VITALS:  Patient Vitals for the past 24 hrs:   BP Temp Temp src Pulse Resp SpO2 Height Weight   01/18/23 0100 127/60 -- -- 79 11 96 % -- --   01/18/23 0000 114/57 -- -- 88 14 96 % -- --   01/17/23 2330 111/50 -- -- 91 13 96 % -- --   01/17/23 2228 (!) 153/84 97.7  F (36.5  C) Oral 102 12 96 % 1.829 m (6') 113.4 kg (250 lb)       PHYSICAL EXAM    Constitutional:  Well developed, Well nourished,  HENT:  Normocephalic, Atraumatic, Bilateral external ears normal, Oropharynx moist, Nose normal.   Neck:  Normal range of motion, No meningismus, No stridor.   Eyes:  EOMI, Conjunctiva normal, No discharge.   Respiratory:  Normal breath sounds, No respiratory distress, No wheezing, No chest tenderness.   Cardiovascular:  Normal heart rate, Normal rhythm, No murmurs  GI:  Soft, No tenderness, No guarding,   Musculoskeletal:  Neurovascularly intact distally, No edema, No tenderness, No cyanosis, Good range of motion in all major joints.   Integument:  Warm, Dry, No erythema, No rash.   Lymphatic:  No lymphadenopathy noted.   Neurologic:  Alert & oriented , Normal motor function,  No focal deficits noted.   Psychiatric:  Affect normal, Judgment normal, Mood normal.      LAB:  All pertinent labs reviewed and interpreted.  Results for orders placed or performed during the hospital encounter of 01/17/23   Comprehensive metabolic panel   Result Value Ref Range    Sodium 137 136 - 145 mmol/L    Potassium 4.7 3.5 - 5.0 mmol/L    Chloride 103 98 - 107 mmol/L    Carbon Dioxide (CO2) 21 (L) 22 - 31 mmol/L    Anion Gap 13 5 - 18 mmol/L    Urea Nitrogen 11 8 - 22 mg/dL    Creatinine 1.67 (H) 0.70 - 1.30  mg/dL    Calcium 8.5 8.5 - 10.5 mg/dL    Glucose 235 (H) 70 - 125 mg/dL    Alkaline Phosphatase 53 45 - 120 U/L    AST 38 0 - 40 U/L    ALT 55 (H) 0 - 45 U/L    Protein Total 7.4 6.0 - 8.0 g/dL    Albumin 4.3 3.5 - 5.0 g/dL    Bilirubin Total 0.3 0.0 - 1.0 mg/dL    GFR Estimate 55 (L) >60 mL/min/1.73m2   Ethyl Alcohol Level   Result Value Ref Range    Alcohol, Blood 12 (H) None detected mg/dL   Result Value Ref Range    Troponin I 0.03 0.00 - 0.29 ng/mL   CBC with platelets and differential   Result Value Ref Range    WBC Count 13.1 (H) 4.0 - 11.0 10e3/uL    RBC Count 5.50 4.40 - 5.90 10e6/uL    Hemoglobin 16.2 13.3 - 17.7 g/dL    Hematocrit 46.1 40.0 - 53.0 %    MCV 84 78 - 100 fL    MCH 29.5 26.5 - 33.0 pg    MCHC 35.1 31.5 - 36.5 g/dL    RDW 12.4 10.0 - 15.0 %    Platelet Count 224 150 - 450 10e3/uL    % Neutrophils 82 %    % Lymphocytes 12 %    % Monocytes 4 %    % Eosinophils 1 %    % Basophils 0 %    % Immature Granulocytes 1 %    NRBCs per 100 WBC 0 <1 /100    Absolute Neutrophils 10.9 (H) 1.6 - 8.3 10e3/uL    Absolute Lymphocytes 1.5 0.8 - 5.3 10e3/uL    Absolute Monocytes 0.5 0.0 - 1.3 10e3/uL    Absolute Eosinophils 0.1 0.0 - 0.7 10e3/uL    Absolute Basophils 0.0 0.0 - 0.2 10e3/uL    Absolute Immature Granulocytes 0.1 <=0.4 10e3/uL    Absolute NRBCs 0.0 10e3/uL   Drugs of Abuse 1 Panel, Urine ([facility] Only)   Result Value Ref Range    Amphetamines Urine Screen Negative Screen Negative    Benzodiazepines Urine Screen Negative Screen Negative    Opiates Urine Screen Negative Screen Negative    PCP Urine Screen Negative Screen Negative    Cannabinoids Urine Screen Negative Screen Negative    Barbiturates Urine Screen Negative Screen Negative    Cocaine Urine Screen Negative Screen Negative    Oxycodone Urine Screen Negative Screen Negative    Creatinine Urine mg/dL 116 mg/dL         EK-rate is 100, sinus, there is no ST segment elevation or depression appreciated.  Prolonged QT.  EKG is unchanged  from May 2019.  I have independently reviewed and interpreted this EKG            Colette June MD  Emergency Medicine  [facility] EMERGENCY ROOM  [address] [facility] [id number] [phone]  Dept: [phone]       Colette June MD  01/18/23 0218

## 2023-01-18 NOTE — ED TRIAGE NOTES
Here by [facility], per CG, family member found patient down. When PD arrived, patient had signs of overdose including respiratory depression and pinpoint pupils. Was given 8mg of narcan per PD.   Patient now alert and oriented x 4, cooperative and calm, pipils are 3+ round and brisk, no signs of respiratory depression, and denies pain anywhere    Reports drinking alcohol and snorting fentanyl tonight that aptient got from the streets.   Hx of previous right eye        Triage Assessment     Row Name 01/17/23 1842       Triage Assessment (Adult)    Airway WDL WDL       Respiratory WDL    Respiratory WDL WDL       Skin Circulation/Temperature WDL    Skin Circulation/Temperature WDL WDL       Cardiac WDL    Cardiac WDL WDL       Peripheral/Neurovascular WDL    Peripheral Neurovascular WDL WDL       Cognitive/Neuro/Behavioral WDL    Cognitive/Neuro/Behavioral WDL WDL

## 2023-01-18 NOTE — TELEPHONE ENCOUNTER
Addiction Medicine/Recovery Clinic referral. Attempted to call patient x 2to offer Recovery Clinic services, including walk-in hours. No ringtone; no voicemail.    [facility]  [address], [address]   [place], [address]  Phone: [phone]  Fax: [phone]    Open Monday-Friday  9:00am-4:00pm  Closed over lunch hour  Walk in hours: 9am-11:30am and 12:30-3pm    [place] RN on 1/18/2023 at 10:07 AM

## 2023-01-18 NOTE — DISCHARGE INSTRUCTIONS
Abstain from illicit drug use  Return to the ER for worsening problems or concerns  Have your doctor recheck your kidney function since it was mildly abnormal tonight, which is likely just from dehydration.